# Patient Record
Sex: MALE | Race: WHITE | Employment: FULL TIME | URBAN - METROPOLITAN AREA
[De-identification: names, ages, dates, MRNs, and addresses within clinical notes are randomized per-mention and may not be internally consistent; named-entity substitution may affect disease eponyms.]

---

## 2019-01-11 ENCOUNTER — THERAPY VISIT (OUTPATIENT)
Dept: PHYSICAL THERAPY | Facility: CLINIC | Age: 29
End: 2019-01-11
Payer: COMMERCIAL

## 2019-01-11 DIAGNOSIS — M54.50 LUMBAGO: Primary | ICD-10-CM

## 2019-01-11 PROCEDURE — 97530 THERAPEUTIC ACTIVITIES: CPT | Mod: GP | Performed by: PHYSICAL THERAPIST

## 2019-01-11 PROCEDURE — 97110 THERAPEUTIC EXERCISES: CPT | Mod: GP | Performed by: PHYSICAL THERAPIST

## 2019-01-11 PROCEDURE — 97161 PT EVAL LOW COMPLEX 20 MIN: CPT | Mod: GP | Performed by: PHYSICAL THERAPIST

## 2019-01-11 NOTE — PROGRESS NOTES
Initial Evaluation   Subjective:  PRIMARY COMPLAINT/HISTORY:   Patient reports lower back and right leg pain.  Patient reports right leg numbness/tingling, pain with coughing/sneezing/straining into his low back.   ONSET: 4 months ago had back pain but about a month ago they started down his right leg, 2-3 weeks ago it worsened  HISTORY OF PRESENT ILLNESS/MECHANISM OF INJURY: golfing     PRIOR LEVEL OF FUNCTION: less pain limitations with sitting 10 minutes, walking 15 feet, transitional movements  CURRENT LEVEL OF FUNCTION: pain limitations with sitting 10 minutes, walking 15 feet, transitional movements  OCCUPATION/WORK STATUS: , prolonged standing of 12 hour shift, 3 days a week  GENERAL HEALTH:  Good   RECREATIONAL ACTIVITY/EXERCISE: walking/golfing    PAIN: 8/10  CONSTANT/INTERMITTENT: constant  QUALITY: ache  STATUS:   Unchanging to worsening  MANAGEMENT TO DATE: activity avoidance, activity modification, 10-12 ibuprofen per day to make things tolerable, chiropractic care for two visits of some benefit (no manipulations)  AGGRAVATING FACTORS: increased activity, sitting, walking, prolonged standing  ALLEVIATING FACTORS: activity avoidance, activity modification, resting, medication  SLEEP:   Able to return to sleep if awoken  MEDICAL SCREENING     PAST MEDICAL HISTORY:  none  IMAGING/DIAGNOSTIC TESTS: none  PRECAUTIONS: Patient denies any recent episodes of loss of bowel or bladder control, saddle anesthesia, headaches, vision changes, dizziness, lightheadedness, nausea, vomiting, numbness or weakness, balance/fine motor/dexterity changes, night sweats, fevers, chills, syncope, malaise, and abnormal weight changes.   History of Falling: negative  PATIENT S GOALS:   1. Patient would like to have less pain limitations to be able to sit and complete work tasks.        Objective:  GAIT: antalgic, decreased gait speed, trunk lean  OBSERVATION/STATIC POSTURE: slight left lateral shift  DERMATOMES:  "Sensory deficits to L4-5 on the right lower extremity compared to the left with response to light touch  MYOTOMES: No overt weakness to the L2-S2 myotomes bilaterally  REFLEXES: 1+/4+ bilaterally to the Patellar and Achilles reflexes bilaterally  LUMBAR ACTIVE RANGE OF MOTION:  Flexion Finger tips to proximal thigh, (+)    Extension Min limitations, end range pain   Extension x 10 Min limitations, less pain limitations    Side bend L within normal limits, right sided back pain   Side bend R within normal limits   Quadrant L Min limited, (+) pain   Quadrant R   Min limited, (+) pain         Side glide L No effect   Side glide R \"pull\" into the right back     SPECIAL TESTS AND OTHER TESTS:  Slump Test:  ( - )L    ( + )R  Prone on elbows x 5 min: less pain with walking, improved gait speed    ASSESSMENT/PLAN  Patient is a 28 year old male with lumbar complaints.    Patient has the following significant findings with corresponding treatment plan.                Diagnosis 1:  Lumbar discogenic - posterior derrangement  Pain -  mechanical traction, manual therapy, splint/taping/bracing/orthotics, self management, education, directional preference exercise and home program  Decreased ROM/flexibility - manual therapy and therapeutic exercise  Decreased joint mobility - manual therapy and therapeutic exercise  Decreased strength - therapeutic exercise and therapeutic activities  Impaired gait - gait training  Impaired muscle performance - neuro re-education  Decreased function - therapeutic activities    Therapy Evaluation Codes:   1) History comprised of:   Personal factors that impact the plan of care:      None.    Comorbidity factors that impact the plan of care are:      None.     Medications impacting care: None.  2) Examination of Body Systems comprised of:   Body structures and functions that impact the plan of care:      Lumbar spine.   Activity limitations that impact the plan of care are:      Bending, Driving, " Lifting and Sitting.  3) Clinical presentation characteristics are:   Stable/Uncomplicated.  4) Decision-Making    Low complexity using standardized patient assessment instrument and/or measureable assessment of functional outcome.  Cumulative Therapy Evaluation is: Low complexity.    Previous and current functional limitations:  (See Goal Flow Sheet for this information)    Short term and Long term goals: (See Goal Flow Sheet for this information)     Communication ability:  Patient appears to be able to clearly communicate and understand verbal and written communication and follow directions correctly.  Treatment Explanation - The following has been discussed with the patient:   RX ordered/plan of care  Anticipated outcomes  Possible risks and side effects  This patient would benefit from PT intervention to resume normal activities.   Rehab potential is good.    Frequency:  1 X week, once daily  Duration:  for 6 weeks  Discharge Plan:  Achieve all LTG.  Independent in home treatment program.  Reach maximal therapeutic benefit.    Please refer to the daily flowsheet for treatment today, total treatment time and time spent performing 1:1 timed codes.

## 2019-04-10 NOTE — PROGRESS NOTES
ASSESSMENT/PLAN:   DISCHARGE REPORT  Patient has not returned to physical therapy, therefore, we are unable to assess the patient's progress.  Current status is unknown and we are unable to assess goals, discharge G codes and functional outcome measures cannot be reported.  We would be happy to work with the patient if they were to return in the future. Patient will be discharged from physical therapy as skilled physical therapy is no longer indicated. Please refer to the last SOAP note for specific details on functional status.